# Patient Record
Sex: MALE | Race: WHITE
[De-identification: names, ages, dates, MRNs, and addresses within clinical notes are randomized per-mention and may not be internally consistent; named-entity substitution may affect disease eponyms.]

---

## 2017-10-18 ENCOUNTER — HOSPITAL ENCOUNTER (EMERGENCY)
Dept: HOSPITAL 43 - DL.ED | Age: 37
Discharge: HOME | End: 2017-10-18
Payer: COMMERCIAL

## 2017-10-18 DIAGNOSIS — R55: Primary | ICD-10-CM

## 2017-10-18 LAB
CHLORIDE SERPL-SCNC: 104 MMOL/L (ref 101–111)
SODIUM SERPL-SCNC: 137 MMOL/L (ref 135–145)
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
## 2017-10-19 NOTE — EKG
10/18/2017 - JOAN BLOOD -

 

TIME:  12:16 p.m.

 

EKG shows sinus bradycardia.  The heart rate is 56 per minute.

 

DD:  10/19/2017 10:42:12

DT:  10/19/2017 11:17:05

[facility]

Job #:  [id number]

## 2018-03-12 ENCOUNTER — HOSPITAL ENCOUNTER (EMERGENCY)
Dept: HOSPITAL 43 - DL.ED | Age: 38
Discharge: HOME | End: 2018-03-12
Payer: COMMERCIAL

## 2018-03-12 DIAGNOSIS — S76.312A: ICD-10-CM

## 2018-03-12 DIAGNOSIS — F17.210: ICD-10-CM
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
## 2018-03-12 DIAGNOSIS — S39.012A: Primary | ICD-10-CM

## 2018-03-12 PROCEDURE — 96372 THER/PROPH/DIAG INJ SC/IM: CPT

## 2018-03-12 PROCEDURE — 99284 EMERGENCY DEPT VISIT MOD MDM: CPT

## 2018-03-12 PROCEDURE — 72131 CT LUMBAR SPINE W/O DYE: CPT
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
## 2018-08-27 ENCOUNTER — HOSPITAL ENCOUNTER (EMERGENCY)
Dept: HOSPITAL 43 - DL.ED | Age: 38
Discharge: HOME | End: 2018-08-27
Payer: COMMERCIAL
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
## 2018-08-27 DIAGNOSIS — Z23: ICD-10-CM

## 2018-08-27 DIAGNOSIS — S61.213A: Primary | ICD-10-CM

## 2018-08-27 DIAGNOSIS — M79.605: ICD-10-CM
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
## 2018-08-27 NOTE — CR
CLINICAL HISTORY: 38-year-old male with concern "foreign body" middle (third) finger.

 

INTERPRETATION: Soft tissue swelling distal end of the middle or third finger left hand. Chronic arth
ritic changes underlying DIP joint. 

 

No sign of foreign body, inflammatory periostitis, osteomyelitis, left middle finger fracture or disl
ocation.

## 2021-11-25 ENCOUNTER — HOSPITAL ENCOUNTER (EMERGENCY)
Dept: HOSPITAL 43 - DL.ED | Age: 41
Discharge: LEFT BEFORE DISCHARGE | End: 2021-11-25
Payer: COMMERCIAL

## 2021-11-25 DIAGNOSIS — Z53.21: Primary | ICD-10-CM
